# Patient Record
(demographics unavailable — no encounter records)

---

## 2024-12-06 NOTE — REASON FOR VISIT
[Home] : at home, [unfilled] , at the time of the visit. [Medical Office: ([facility])___] : at the medical office located in  [Patient] : the patient

## 2024-12-06 NOTE — HISTORY OF PRESENT ILLNESS
[FreeTextEntry1] : 26-year-old man right-handed with a history of attention deficit disorder, for follow-up visit, patient reports doing well, last time seen in the office was April of this year. Presently on Vyvanse the chewable version, 30 mg once a day to good effect.  Helps him focus more productive.  No reported side effects. He prefers the chewable version, initially was on the tablet capsule, but finds that he can actually titrate the dose take half save the half of the time especially of his days short. No reported problem with the medication, such as trouble sleeping, no headache, no chest pain or palpitation, no mood changes. In the past also given the short acting Adderall, but he does not needed at the moment.

## 2024-12-06 NOTE — DISCUSSION/SUMMARY
[FreeTextEntry1] : 26-year-old right-handed man with a history of attention deficit hyperactivity disorder, doing well on Vyvanse 30 mg, the chewable version once a day. Reviewed and discussed treatment, no changes at this time. Patient will call for refills as needed. Return to office, 6 months.